# Patient Record
Sex: MALE | Race: BLACK OR AFRICAN AMERICAN
[De-identification: names, ages, dates, MRNs, and addresses within clinical notes are randomized per-mention and may not be internally consistent; named-entity substitution may affect disease eponyms.]

---

## 2020-07-20 ENCOUNTER — HOSPITAL ENCOUNTER (INPATIENT)
Dept: HOSPITAL 15 - ER | Age: 60
LOS: 5 days | Discharge: HOME | DRG: 871 | End: 2020-07-25
Attending: INTERNAL MEDICINE | Admitting: INTERNAL MEDICINE
Payer: COMMERCIAL

## 2020-07-20 VITALS — WEIGHT: 176.37 LBS | HEIGHT: 70 IN | BODY MASS INDEX: 25.25 KG/M2

## 2020-07-20 VITALS — DIASTOLIC BLOOD PRESSURE: 101 MMHG | SYSTOLIC BLOOD PRESSURE: 160 MMHG

## 2020-07-20 DIAGNOSIS — Z88.0: ICD-10-CM

## 2020-07-20 DIAGNOSIS — Z20.828: ICD-10-CM

## 2020-07-20 DIAGNOSIS — A41.01: Primary | ICD-10-CM

## 2020-07-20 DIAGNOSIS — M43.6: ICD-10-CM

## 2020-07-20 DIAGNOSIS — R74.0: ICD-10-CM

## 2020-07-20 DIAGNOSIS — R79.82: ICD-10-CM

## 2020-07-20 DIAGNOSIS — T38.0X5A: ICD-10-CM

## 2020-07-20 DIAGNOSIS — D50.9: ICD-10-CM

## 2020-07-20 DIAGNOSIS — J18.9: ICD-10-CM

## 2020-07-20 DIAGNOSIS — I13.2: ICD-10-CM

## 2020-07-20 DIAGNOSIS — D63.1: ICD-10-CM

## 2020-07-20 DIAGNOSIS — R94.4: ICD-10-CM

## 2020-07-20 DIAGNOSIS — N18.6: ICD-10-CM

## 2020-07-20 DIAGNOSIS — Z99.2: ICD-10-CM

## 2020-07-20 DIAGNOSIS — I50.9: ICD-10-CM

## 2020-07-20 DIAGNOSIS — E87.5: ICD-10-CM

## 2020-07-20 DIAGNOSIS — Z79.899: ICD-10-CM

## 2020-07-20 DIAGNOSIS — E78.5: ICD-10-CM

## 2020-07-20 DIAGNOSIS — D53.9: ICD-10-CM

## 2020-07-20 DIAGNOSIS — R79.89: ICD-10-CM

## 2020-07-20 LAB
ALBUMIN SERPL-MCNC: 3.2 G/DL (ref 3.4–5)
ALBUMIN SERPL-MCNC: 3.7 G/DL (ref 3.4–5)
ALP SERPL-CCNC: 163 U/L (ref 45–117)
ALP SERPL-CCNC: 187 U/L (ref 45–117)
ALT SERPL-CCNC: 18 U/L (ref 16–61)
ALT SERPL-CCNC: 20 U/L (ref 16–61)
ANION GAP SERPL CALCULATED.3IONS-SCNC: 13 MMOL/L (ref 5–15)
ANION GAP SERPL CALCULATED.3IONS-SCNC: 14 MMOL/L (ref 5–15)
APTT PPP: 31.5 SEC (ref 23.64–32.05)
BILIRUB SERPL-MCNC: 0.4 MG/DL (ref 0.2–1)
BILIRUB SERPL-MCNC: 0.6 MG/DL (ref 0.2–1)
BUN SERPL-MCNC: 103 MG/DL (ref 7–18)
BUN SERPL-MCNC: 90 MG/DL (ref 7–18)
BUN/CREAT SERPL: 5.7
BUN/CREAT SERPL: 6.4
CALCIUM SERPL-MCNC: 7.9 MG/DL (ref 8.5–10.1)
CALCIUM SERPL-MCNC: 8.1 MG/DL (ref 8.5–10.1)
CHLORIDE SERPL-SCNC: 101 MMOL/L (ref 98–107)
CHLORIDE SERPL-SCNC: 102 MMOL/L (ref 98–107)
CHOLEST SERPL-MCNC: 120 MG/DL (ref ?–200)
CO2 SERPL-SCNC: 20 MMOL/L (ref 21–32)
CO2 SERPL-SCNC: 21 MMOL/L (ref 21–32)
GLUCOSE SERPL-MCNC: 114 MG/DL (ref 74–106)
GLUCOSE SERPL-MCNC: 116 MG/DL (ref 74–106)
HCT VFR BLD AUTO: 34 % (ref 41–53)
HCT VFR BLD AUTO: 37 % (ref 41–53)
HDLC SERPL-MCNC: 15 MG/DL (ref 40–59)
HGB BLD-MCNC: 11.1 G/DL (ref 13.5–17.5)
HGB BLD-MCNC: 12.1 G/DL (ref 13.5–17.5)
INR PPP: 1.15 (ref 0.9–1.15)
MAGNESIUM SERPL-MCNC: 2 MG/DL (ref 1.6–2.6)
MAGNESIUM SERPL-MCNC: 2.1 MG/DL (ref 1.6–2.6)
MCH RBC QN AUTO: 31 PG (ref 28–32)
MCH RBC QN AUTO: 31.3 PG (ref 28–32)
MCV RBC AUTO: 94.8 FL (ref 80–100)
MCV RBC AUTO: 95.5 FL (ref 80–100)
NRBC BLD QL AUTO: 0 %
NRBC BLD QL AUTO: 0 %
POTASSIUM SERPL-SCNC: 4.4 MMOL/L (ref 3.5–5.1)
POTASSIUM SERPL-SCNC: 4.8 MMOL/L (ref 3.5–5.1)
PROT SERPL-MCNC: 7.3 G/DL (ref 6.4–8.2)
PROT SERPL-MCNC: 8.2 G/DL (ref 6.4–8.2)
SODIUM SERPL-SCNC: 134 MMOL/L (ref 136–145)
SODIUM SERPL-SCNC: 137 MMOL/L (ref 136–145)
TRIGL SERPL-MCNC: 274 MG/DL (ref ?–150)

## 2020-07-20 PROCEDURE — 85730 THROMBOPLASTIN TIME PARTIAL: CPT

## 2020-07-20 PROCEDURE — 83605 ASSAY OF LACTIC ACID: CPT

## 2020-07-20 PROCEDURE — 87880 STREP A ASSAY W/OPTIC: CPT

## 2020-07-20 PROCEDURE — 80048 BASIC METABOLIC PNL TOTAL CA: CPT

## 2020-07-20 PROCEDURE — 80061 LIPID PANEL: CPT

## 2020-07-20 PROCEDURE — 83880 ASSAY OF NATRIURETIC PEPTIDE: CPT

## 2020-07-20 PROCEDURE — 87186 SC STD MICRODIL/AGAR DIL: CPT

## 2020-07-20 PROCEDURE — 99291 CRITICAL CARE FIRST HOUR: CPT

## 2020-07-20 PROCEDURE — 82728 ASSAY OF FERRITIN: CPT

## 2020-07-20 PROCEDURE — 85027 COMPLETE CBC AUTOMATED: CPT

## 2020-07-20 PROCEDURE — 93306 TTE W/DOPPLER COMPLETE: CPT

## 2020-07-20 PROCEDURE — 85379 FIBRIN DEGRADATION QUANT: CPT

## 2020-07-20 PROCEDURE — 87077 CULTURE AEROBIC IDENTIFY: CPT

## 2020-07-20 PROCEDURE — 80202 ASSAY OF VANCOMYCIN: CPT

## 2020-07-20 PROCEDURE — 85610 PROTHROMBIN TIME: CPT

## 2020-07-20 PROCEDURE — 85007 BL SMEAR W/DIFF WBC COUNT: CPT

## 2020-07-20 PROCEDURE — 83036 HEMOGLOBIN GLYCOSYLATED A1C: CPT

## 2020-07-20 PROCEDURE — 36415 COLL VENOUS BLD VENIPUNCTURE: CPT

## 2020-07-20 PROCEDURE — 83550 IRON BINDING TEST: CPT

## 2020-07-20 PROCEDURE — 86141 C-REACTIVE PROTEIN HS: CPT

## 2020-07-20 PROCEDURE — 87070 CULTURE OTHR SPECIMN AEROBIC: CPT

## 2020-07-20 PROCEDURE — 84484 ASSAY OF TROPONIN QUANT: CPT

## 2020-07-20 PROCEDURE — 84443 ASSAY THYROID STIM HORMONE: CPT

## 2020-07-20 PROCEDURE — 93005 ELECTROCARDIOGRAM TRACING: CPT

## 2020-07-20 PROCEDURE — 87804 INFLUENZA ASSAY W/OPTIC: CPT

## 2020-07-20 PROCEDURE — 71045 X-RAY EXAM CHEST 1 VIEW: CPT

## 2020-07-20 PROCEDURE — 87040 BLOOD CULTURE FOR BACTERIA: CPT

## 2020-07-20 PROCEDURE — 78582 LUNG VENTILAT&PERFUS IMAGING: CPT

## 2020-07-20 PROCEDURE — 90935 HEMODIALYSIS ONE EVALUATION: CPT

## 2020-07-20 PROCEDURE — 85025 COMPLETE CBC W/AUTO DIFF WBC: CPT

## 2020-07-20 PROCEDURE — 80053 COMPREHEN METABOLIC PANEL: CPT

## 2020-07-20 PROCEDURE — 84100 ASSAY OF PHOSPHORUS: CPT

## 2020-07-20 PROCEDURE — 83540 ASSAY OF IRON: CPT

## 2020-07-20 PROCEDURE — 83615 LACTATE (LD) (LDH) ENZYME: CPT

## 2020-07-20 PROCEDURE — 83735 ASSAY OF MAGNESIUM: CPT

## 2020-07-20 RX ADMIN — MORPHINE SULFATE PRN MG: 2 INJECTION, SOLUTION INTRAMUSCULAR; INTRAVENOUS at 22:08

## 2020-07-20 RX ADMIN — DOXYCYCLINE SCH MLS/HR: 100 INJECTION, POWDER, LYOPHILIZED, FOR SOLUTION INTRAVENOUS at 23:09

## 2020-07-21 VITALS — DIASTOLIC BLOOD PRESSURE: 98 MMHG | SYSTOLIC BLOOD PRESSURE: 156 MMHG

## 2020-07-21 VITALS — DIASTOLIC BLOOD PRESSURE: 92 MMHG | SYSTOLIC BLOOD PRESSURE: 132 MMHG

## 2020-07-21 VITALS — DIASTOLIC BLOOD PRESSURE: 99 MMHG | SYSTOLIC BLOOD PRESSURE: 162 MMHG

## 2020-07-21 VITALS — DIASTOLIC BLOOD PRESSURE: 92 MMHG | SYSTOLIC BLOOD PRESSURE: 157 MMHG

## 2020-07-21 VITALS — SYSTOLIC BLOOD PRESSURE: 157 MMHG | DIASTOLIC BLOOD PRESSURE: 92 MMHG

## 2020-07-21 VITALS — SYSTOLIC BLOOD PRESSURE: 150 MMHG | DIASTOLIC BLOOD PRESSURE: 93 MMHG

## 2020-07-21 LAB
ALBUMIN SERPL-MCNC: 3 G/DL (ref 3.4–5)
ALP SERPL-CCNC: 138 U/L (ref 45–117)
ALT SERPL-CCNC: 18 U/L (ref 16–61)
ANION GAP SERPL CALCULATED.3IONS-SCNC: 11 MMOL/L (ref 5–15)
APTT PPP: 37.6 SEC (ref 23.64–32.05)
BILIRUB SERPL-MCNC: 0.4 MG/DL (ref 0.2–1)
BUN SERPL-MCNC: 116 MG/DL (ref 7–18)
BUN/CREAT SERPL: 7
CALCIUM SERPL-MCNC: 7.8 MG/DL (ref 8.5–10.1)
CHLORIDE SERPL-SCNC: 102 MMOL/L (ref 98–107)
CO2 SERPL-SCNC: 21 MMOL/L (ref 21–32)
GLUCOSE SERPL-MCNC: 123 MG/DL (ref 74–106)
HCT VFR BLD AUTO: 32 % (ref 41–53)
HGB BLD-MCNC: 10.7 G/DL (ref 13.5–17.5)
INR PPP: 1.07 (ref 0.9–1.15)
IRON SERPL-MCNC: 11 UG/DL (ref 65–175)
MAGNESIUM SERPL-MCNC: 2.4 MG/DL (ref 1.6–2.6)
MCH RBC QN AUTO: 31.2 PG (ref 28–32)
MCV RBC AUTO: 93.6 FL (ref 80–100)
NRBC BLD QL AUTO: 0 %
POTASSIUM SERPL-SCNC: 5.5 MMOL/L (ref 3.5–5.1)
PROT SERPL-MCNC: 7.2 G/DL (ref 6.4–8.2)
SODIUM SERPL-SCNC: 134 MMOL/L (ref 136–145)
TIBC SERPL-MCNC: 158 UG/DL (ref 250–450)

## 2020-07-21 PROCEDURE — 5A1D70Z PERFORMANCE OF URINARY FILTRATION, INTERMITTENT, LESS THAN 6 HOURS PER DAY: ICD-10-PCS | Performed by: INTERNAL MEDICINE

## 2020-07-21 RX ADMIN — RENAGEL SCH MG: 800 TABLET ORAL at 12:00

## 2020-07-21 RX ADMIN — HYDROMORPHONE HYDROCHLORIDE PRN MG: 2 INJECTION, SOLUTION INTRAMUSCULAR; INTRAVENOUS; SUBCUTANEOUS at 19:42

## 2020-07-21 RX ADMIN — VITAMIN D, TAB 1000IU (100/BT) SCH UNIT: 25 TAB at 09:47

## 2020-07-21 RX ADMIN — RENAGEL SCH MG: 800 TABLET ORAL at 17:27

## 2020-07-21 RX ADMIN — ONDANSETRON HYDROCHLORIDE PRN MG: 2 INJECTION, SOLUTION INTRAMUSCULAR; INTRAVENOUS at 09:58

## 2020-07-21 RX ADMIN — METOPROLOL SUCCINATE SCH MG: 50 TABLET, EXTENDED RELEASE ORAL at 09:56

## 2020-07-21 RX ADMIN — Medication SCH MG: at 09:47

## 2020-07-21 RX ADMIN — MORPHINE SULFATE PRN MG: 2 INJECTION, SOLUTION INTRAMUSCULAR; INTRAVENOUS at 09:58

## 2020-07-21 RX ADMIN — RENAGEL SCH MG: 800 TABLET ORAL at 08:00

## 2020-07-21 RX ADMIN — ZINC SULFATE CAP 220 MG (50 MG ELEMENTAL ZN) SCH MG: 220 (50 ZN) CAP at 09:47

## 2020-07-21 RX ADMIN — DOXYCYCLINE SCH MLS/HR: 100 INJECTION, POWDER, LYOPHILIZED, FOR SOLUTION INTRAVENOUS at 09:57

## 2020-07-21 RX ADMIN — DOXYCYCLINE SCH MLS/HR: 100 INJECTION, POWDER, LYOPHILIZED, FOR SOLUTION INTRAVENOUS at 22:17

## 2020-07-21 RX ADMIN — HYDROMORPHONE HYDROCHLORIDE PRN MG: 2 INJECTION, SOLUTION INTRAMUSCULAR; INTRAVENOUS; SUBCUTANEOUS at 01:25

## 2020-07-21 RX ADMIN — MORPHINE SULFATE PRN MG: 2 INJECTION, SOLUTION INTRAMUSCULAR; INTRAVENOUS at 17:04

## 2020-07-21 RX ADMIN — HYDRALAZINE HYDROCHLORIDE PRN MG: 20 INJECTION INTRAMUSCULAR; INTRAVENOUS at 16:47

## 2020-07-22 VITALS — DIASTOLIC BLOOD PRESSURE: 84 MMHG | SYSTOLIC BLOOD PRESSURE: 140 MMHG

## 2020-07-22 VITALS — SYSTOLIC BLOOD PRESSURE: 150 MMHG | DIASTOLIC BLOOD PRESSURE: 101 MMHG

## 2020-07-22 VITALS — DIASTOLIC BLOOD PRESSURE: 86 MMHG | SYSTOLIC BLOOD PRESSURE: 134 MMHG

## 2020-07-22 VITALS — SYSTOLIC BLOOD PRESSURE: 152 MMHG | DIASTOLIC BLOOD PRESSURE: 97 MMHG

## 2020-07-22 VITALS — SYSTOLIC BLOOD PRESSURE: 142 MMHG | DIASTOLIC BLOOD PRESSURE: 91 MMHG

## 2020-07-22 LAB
ANION GAP SERPL CALCULATED.3IONS-SCNC: 11 MMOL/L (ref 5–15)
BUN SERPL-MCNC: 83 MG/DL (ref 7–18)
BUN/CREAT SERPL: 6.7
CALCIUM SERPL-MCNC: 7.3 MG/DL (ref 8.5–10.1)
CHLORIDE SERPL-SCNC: 98 MMOL/L (ref 98–107)
CO2 SERPL-SCNC: 25 MMOL/L (ref 21–32)
GLUCOSE SERPL-MCNC: 108 MG/DL (ref 74–106)
HCT VFR BLD AUTO: 33.7 % (ref 41–53)
HGB BLD-MCNC: 11.1 G/DL (ref 13.5–17.5)
MAGNESIUM SERPL-MCNC: 2.1 MG/DL (ref 1.6–2.6)
MCH RBC QN AUTO: 30.6 PG (ref 28–32)
MCV RBC AUTO: 93.4 FL (ref 80–100)
NRBC BLD QL AUTO: 0 %
POTASSIUM SERPL-SCNC: 4 MMOL/L (ref 3.5–5.1)
SODIUM SERPL-SCNC: 134 MMOL/L (ref 136–145)

## 2020-07-22 PROCEDURE — 5A1D70Z PERFORMANCE OF URINARY FILTRATION, INTERMITTENT, LESS THAN 6 HOURS PER DAY: ICD-10-PCS | Performed by: INTERNAL MEDICINE

## 2020-07-22 RX ADMIN — RENAGEL SCH MG: 800 TABLET ORAL at 09:41

## 2020-07-22 RX ADMIN — RENAGEL SCH MG: 800 TABLET ORAL at 17:25

## 2020-07-22 RX ADMIN — MORPHINE SULFATE PRN MG: 2 INJECTION, SOLUTION INTRAMUSCULAR; INTRAVENOUS at 18:47

## 2020-07-22 RX ADMIN — MORPHINE SULFATE PRN MG: 2 INJECTION, SOLUTION INTRAMUSCULAR; INTRAVENOUS at 06:17

## 2020-07-22 RX ADMIN — DEXTROSE SCH UNITS: 5 SOLUTION INTRAVENOUS at 09:42

## 2020-07-22 RX ADMIN — DOXYCYCLINE SCH MLS/HR: 100 INJECTION, POWDER, LYOPHILIZED, FOR SOLUTION INTRAVENOUS at 09:41

## 2020-07-22 RX ADMIN — ZINC SULFATE CAP 220 MG (50 MG ELEMENTAL ZN) SCH MG: 220 (50 ZN) CAP at 08:14

## 2020-07-22 RX ADMIN — RENAGEL SCH MG: 800 TABLET ORAL at 12:34

## 2020-07-22 RX ADMIN — MORPHINE SULFATE PRN MG: 2 INJECTION, SOLUTION INTRAMUSCULAR; INTRAVENOUS at 01:47

## 2020-07-22 RX ADMIN — DEXTROSE SCH UNITS: 5 SOLUTION INTRAVENOUS at 21:33

## 2020-07-22 RX ADMIN — METOPROLOL SUCCINATE SCH MG: 50 TABLET, EXTENDED RELEASE ORAL at 09:41

## 2020-07-22 RX ADMIN — VITAMIN D, TAB 1000IU (100/BT) SCH UNIT: 25 TAB at 09:41

## 2020-07-22 RX ADMIN — ONDANSETRON HYDROCHLORIDE PRN MG: 2 INJECTION, SOLUTION INTRAMUSCULAR; INTRAVENOUS at 09:40

## 2020-07-22 RX ADMIN — DOXYCYCLINE SCH MLS/HR: 100 INJECTION, POWDER, LYOPHILIZED, FOR SOLUTION INTRAVENOUS at 21:31

## 2020-07-22 RX ADMIN — HYDROMORPHONE HYDROCHLORIDE PRN MG: 2 INJECTION, SOLUTION INTRAMUSCULAR; INTRAVENOUS; SUBCUTANEOUS at 09:41

## 2020-07-22 RX ADMIN — Medication SCH MG: at 08:14

## 2020-07-23 VITALS — DIASTOLIC BLOOD PRESSURE: 96 MMHG | SYSTOLIC BLOOD PRESSURE: 124 MMHG

## 2020-07-23 VITALS — DIASTOLIC BLOOD PRESSURE: 90 MMHG | SYSTOLIC BLOOD PRESSURE: 134 MMHG

## 2020-07-23 VITALS — DIASTOLIC BLOOD PRESSURE: 91 MMHG | SYSTOLIC BLOOD PRESSURE: 138 MMHG

## 2020-07-23 VITALS — DIASTOLIC BLOOD PRESSURE: 99 MMHG | SYSTOLIC BLOOD PRESSURE: 149 MMHG

## 2020-07-23 VITALS — DIASTOLIC BLOOD PRESSURE: 95 MMHG | SYSTOLIC BLOOD PRESSURE: 166 MMHG

## 2020-07-23 LAB
ALBUMIN SERPL-MCNC: 2.9 G/DL (ref 3.4–5)
ALP SERPL-CCNC: 135 U/L (ref 45–117)
ALT SERPL-CCNC: 15 U/L (ref 16–61)
ANION GAP SERPL CALCULATED.3IONS-SCNC: 10 MMOL/L (ref 5–15)
BILIRUB SERPL-MCNC: 0.4 MG/DL (ref 0.2–1)
BUN SERPL-MCNC: 69 MG/DL (ref 7–18)
BUN/CREAT SERPL: 6.7
CALCIUM SERPL-MCNC: 7.6 MG/DL (ref 8.5–10.1)
CHLORIDE SERPL-SCNC: 97 MMOL/L (ref 98–107)
CO2 SERPL-SCNC: 27 MMOL/L (ref 21–32)
GLUCOSE SERPL-MCNC: 121 MG/DL (ref 74–106)
HCT VFR BLD AUTO: 35 % (ref 41–53)
HGB BLD-MCNC: 11.7 G/DL (ref 13.5–17.5)
MCH RBC QN AUTO: 31.5 PG (ref 28–32)
MCV RBC AUTO: 94.4 FL (ref 80–100)
POTASSIUM SERPL-SCNC: 3.9 MMOL/L (ref 3.5–5.1)
PROT SERPL-MCNC: 7.4 G/DL (ref 6.4–8.2)
SODIUM SERPL-SCNC: 134 MMOL/L (ref 136–145)

## 2020-07-23 PROCEDURE — 5A1D70Z PERFORMANCE OF URINARY FILTRATION, INTERMITTENT, LESS THAN 6 HOURS PER DAY: ICD-10-PCS | Performed by: INTERNAL MEDICINE

## 2020-07-23 RX ADMIN — HYDRALAZINE HYDROCHLORIDE PRN MG: 20 INJECTION INTRAMUSCULAR; INTRAVENOUS at 08:36

## 2020-07-23 RX ADMIN — RENAGEL SCH MG: 800 TABLET ORAL at 12:12

## 2020-07-23 RX ADMIN — METOPROLOL SUCCINATE SCH MG: 50 TABLET, EXTENDED RELEASE ORAL at 09:50

## 2020-07-23 RX ADMIN — MORPHINE SULFATE PRN MG: 2 INJECTION, SOLUTION INTRAMUSCULAR; INTRAVENOUS at 09:49

## 2020-07-23 RX ADMIN — RENAGEL SCH MG: 800 TABLET ORAL at 17:35

## 2020-07-23 RX ADMIN — DOXYCYCLINE SCH MLS/HR: 100 INJECTION, POWDER, LYOPHILIZED, FOR SOLUTION INTRAVENOUS at 21:51

## 2020-07-23 RX ADMIN — MORPHINE SULFATE PRN MG: 2 INJECTION, SOLUTION INTRAMUSCULAR; INTRAVENOUS at 01:19

## 2020-07-23 RX ADMIN — RENAGEL SCH MG: 800 TABLET ORAL at 08:35

## 2020-07-23 RX ADMIN — TROLAMINE SALICYLATE PRN APPLIC: 10 CREAM TOPICAL at 14:00

## 2020-07-23 RX ADMIN — DOXYCYCLINE SCH MLS/HR: 100 INJECTION, POWDER, LYOPHILIZED, FOR SOLUTION INTRAVENOUS at 09:50

## 2020-07-23 RX ADMIN — VITAMIN D, TAB 1000IU (100/BT) SCH UNIT: 25 TAB at 09:50

## 2020-07-23 RX ADMIN — MORPHINE SULFATE PRN MG: 2 INJECTION, SOLUTION INTRAMUSCULAR; INTRAVENOUS at 17:35

## 2020-07-23 RX ADMIN — DEXTROSE SCH UNITS: 5 SOLUTION INTRAVENOUS at 09:51

## 2020-07-23 RX ADMIN — DEXTROSE SCH UNITS: 5 SOLUTION INTRAVENOUS at 21:51

## 2020-07-24 VITALS — DIASTOLIC BLOOD PRESSURE: 93 MMHG | SYSTOLIC BLOOD PRESSURE: 129 MMHG

## 2020-07-24 VITALS — SYSTOLIC BLOOD PRESSURE: 160 MMHG | DIASTOLIC BLOOD PRESSURE: 100 MMHG

## 2020-07-24 VITALS — DIASTOLIC BLOOD PRESSURE: 100 MMHG | SYSTOLIC BLOOD PRESSURE: 126 MMHG

## 2020-07-24 VITALS — DIASTOLIC BLOOD PRESSURE: 97 MMHG | SYSTOLIC BLOOD PRESSURE: 134 MMHG

## 2020-07-24 VITALS — DIASTOLIC BLOOD PRESSURE: 96 MMHG | SYSTOLIC BLOOD PRESSURE: 142 MMHG

## 2020-07-24 LAB
ANION GAP SERPL CALCULATED.3IONS-SCNC: 12 MMOL/L (ref 5–15)
BUN SERPL-MCNC: 62 MG/DL (ref 7–18)
BUN/CREAT SERPL: 6.6
CALCIUM SERPL-MCNC: 8.2 MG/DL (ref 8.5–10.1)
CHLORIDE SERPL-SCNC: 96 MMOL/L (ref 98–107)
CO2 SERPL-SCNC: 26 MMOL/L (ref 21–32)
GLUCOSE SERPL-MCNC: 131 MG/DL (ref 74–106)
HCT VFR BLD AUTO: 38.3 % (ref 41–53)
HGB BLD-MCNC: 12.6 G/DL (ref 13.5–17.5)
MCH RBC QN AUTO: 30.6 PG (ref 28–32)
MCV RBC AUTO: 93.1 FL (ref 80–100)
POTASSIUM SERPL-SCNC: 4 MMOL/L (ref 3.5–5.1)
SODIUM SERPL-SCNC: 134 MMOL/L (ref 136–145)

## 2020-07-24 RX ADMIN — RENAGEL SCH MG: 800 TABLET ORAL at 18:45

## 2020-07-24 RX ADMIN — METOPROLOL SUCCINATE SCH MG: 50 TABLET, EXTENDED RELEASE ORAL at 10:37

## 2020-07-24 RX ADMIN — RENAGEL SCH MG: 800 TABLET ORAL at 08:00

## 2020-07-24 RX ADMIN — DOXYCYCLINE SCH MLS/HR: 100 INJECTION, POWDER, LYOPHILIZED, FOR SOLUTION INTRAVENOUS at 10:39

## 2020-07-24 RX ADMIN — TROLAMINE SALICYLATE PRN APPLIC: 10 CREAM TOPICAL at 08:24

## 2020-07-24 RX ADMIN — RENAGEL SCH MG: 800 TABLET ORAL at 12:40

## 2020-07-24 RX ADMIN — DEXTROSE SCH UNITS: 5 SOLUTION INTRAVENOUS at 10:41

## 2020-07-24 RX ADMIN — DEXTROSE SCH UNITS: 5 SOLUTION INTRAVENOUS at 22:41

## 2020-07-24 RX ADMIN — DOXYCYCLINE SCH MLS/HR: 100 INJECTION, POWDER, LYOPHILIZED, FOR SOLUTION INTRAVENOUS at 22:22

## 2020-07-24 RX ADMIN — VITAMIN D, TAB 1000IU (100/BT) SCH UNIT: 25 TAB at 10:37

## 2020-07-25 VITALS — DIASTOLIC BLOOD PRESSURE: 101 MMHG | SYSTOLIC BLOOD PRESSURE: 159 MMHG

## 2020-07-25 VITALS — SYSTOLIC BLOOD PRESSURE: 137 MMHG | DIASTOLIC BLOOD PRESSURE: 90 MMHG

## 2020-07-25 VITALS — SYSTOLIC BLOOD PRESSURE: 150 MMHG | DIASTOLIC BLOOD PRESSURE: 109 MMHG

## 2020-07-25 VITALS — SYSTOLIC BLOOD PRESSURE: 160 MMHG | DIASTOLIC BLOOD PRESSURE: 98 MMHG

## 2020-07-25 LAB
ANION GAP SERPL CALCULATED.3IONS-SCNC: 11 MMOL/L (ref 5–15)
BUN SERPL-MCNC: 58 MG/DL (ref 7–18)
BUN/CREAT SERPL: 7
CALCIUM SERPL-MCNC: 7.5 MG/DL (ref 8.5–10.1)
CHLORIDE SERPL-SCNC: 96 MMOL/L (ref 98–107)
CO2 SERPL-SCNC: 28 MMOL/L (ref 21–32)
GLUCOSE SERPL-MCNC: 98 MG/DL (ref 74–106)
POTASSIUM SERPL-SCNC: 4.3 MMOL/L (ref 3.5–5.1)
SODIUM SERPL-SCNC: 135 MMOL/L (ref 136–145)

## 2020-07-25 PROCEDURE — 5A1D70Z PERFORMANCE OF URINARY FILTRATION, INTERMITTENT, LESS THAN 6 HOURS PER DAY: ICD-10-PCS | Performed by: INTERNAL MEDICINE

## 2020-07-25 RX ADMIN — RENAGEL SCH MG: 800 TABLET ORAL at 10:01

## 2020-07-25 RX ADMIN — DEXTROSE SCH UNITS: 5 SOLUTION INTRAVENOUS at 10:41

## 2020-07-25 RX ADMIN — RENAGEL SCH MG: 800 TABLET ORAL at 12:11

## 2020-07-25 RX ADMIN — DOXYCYCLINE SCH MLS/HR: 100 INJECTION, POWDER, LYOPHILIZED, FOR SOLUTION INTRAVENOUS at 12:11

## 2020-07-25 RX ADMIN — VITAMIN D, TAB 1000IU (100/BT) SCH UNIT: 25 TAB at 10:40

## 2020-07-25 RX ADMIN — METOPROLOL SUCCINATE SCH MG: 50 TABLET, EXTENDED RELEASE ORAL at 10:40

## 2020-07-31 ENCOUNTER — HOSPITAL ENCOUNTER (EMERGENCY)
Dept: HOSPITAL 15 - ER | Age: 60
Discharge: HOME | End: 2020-07-31
Payer: COMMERCIAL

## 2020-07-31 VITALS — HEIGHT: 70 IN | BODY MASS INDEX: 25.05 KG/M2 | WEIGHT: 175 LBS

## 2020-07-31 VITALS — DIASTOLIC BLOOD PRESSURE: 79 MMHG | SYSTOLIC BLOOD PRESSURE: 129 MMHG

## 2020-07-31 DIAGNOSIS — N18.6: ICD-10-CM

## 2020-07-31 DIAGNOSIS — Z88.0: ICD-10-CM

## 2020-07-31 DIAGNOSIS — I12.0: ICD-10-CM

## 2020-07-31 DIAGNOSIS — M50.10: Primary | ICD-10-CM

## 2020-07-31 PROCEDURE — 72040 X-RAY EXAM NECK SPINE 2-3 VW: CPT

## 2020-12-02 ENCOUNTER — HOSPITAL ENCOUNTER (EMERGENCY)
Dept: HOSPITAL 15 - ER | Age: 60
Discharge: LEFT BEFORE DISCHARGE | End: 2020-12-02
Payer: COMMERCIAL

## 2020-12-02 DIAGNOSIS — Z53.21: ICD-10-CM

## 2020-12-02 DIAGNOSIS — R50.9: Primary | ICD-10-CM

## 2020-12-05 ENCOUNTER — HOSPITAL ENCOUNTER (EMERGENCY)
Dept: HOSPITAL 15 - ER | Age: 60
Discharge: HOME | End: 2020-12-05
Payer: COMMERCIAL

## 2020-12-05 VITALS — DIASTOLIC BLOOD PRESSURE: 65 MMHG | SYSTOLIC BLOOD PRESSURE: 102 MMHG

## 2020-12-05 VITALS — WEIGHT: 180 LBS | HEIGHT: 70 IN | BODY MASS INDEX: 25.77 KG/M2

## 2020-12-05 DIAGNOSIS — M51.36: Primary | ICD-10-CM

## 2020-12-05 DIAGNOSIS — Z88.0: ICD-10-CM

## 2020-12-05 DIAGNOSIS — M79.18: ICD-10-CM

## 2020-12-05 DIAGNOSIS — N18.6: ICD-10-CM

## 2020-12-05 DIAGNOSIS — I12.0: ICD-10-CM

## 2020-12-05 PROCEDURE — 73030 X-RAY EXAM OF SHOULDER: CPT

## 2020-12-05 PROCEDURE — 72070 X-RAY EXAM THORAC SPINE 2VWS: CPT

## 2020-12-05 PROCEDURE — 72100 X-RAY EXAM L-S SPINE 2/3 VWS: CPT

## 2020-12-07 ENCOUNTER — HOSPITAL ENCOUNTER (INPATIENT)
Dept: HOSPITAL 15 - ER | Age: 60
LOS: 6 days | Discharge: NURSING, REHAB OR LONG-TERM CARE | DRG: 871 | End: 2020-12-13
Attending: INTERNAL MEDICINE | Admitting: NURSE PRACTITIONER
Payer: COMMERCIAL

## 2020-12-07 VITALS — WEIGHT: 166.67 LBS | BODY MASS INDEX: 23.86 KG/M2 | HEIGHT: 70 IN

## 2020-12-07 DIAGNOSIS — Z79.899: ICD-10-CM

## 2020-12-07 DIAGNOSIS — E44.0: ICD-10-CM

## 2020-12-07 DIAGNOSIS — D63.8: ICD-10-CM

## 2020-12-07 DIAGNOSIS — A41.89: Primary | ICD-10-CM

## 2020-12-07 DIAGNOSIS — I12.0: ICD-10-CM

## 2020-12-07 DIAGNOSIS — Z20.828: ICD-10-CM

## 2020-12-07 DIAGNOSIS — Z83.3: ICD-10-CM

## 2020-12-07 DIAGNOSIS — N18.6: ICD-10-CM

## 2020-12-07 DIAGNOSIS — B96.89: ICD-10-CM

## 2020-12-07 DIAGNOSIS — E87.1: ICD-10-CM

## 2020-12-07 DIAGNOSIS — Z75.1: ICD-10-CM

## 2020-12-07 DIAGNOSIS — N25.81: ICD-10-CM

## 2020-12-07 DIAGNOSIS — Z88.0: ICD-10-CM

## 2020-12-07 DIAGNOSIS — E87.5: ICD-10-CM

## 2020-12-07 DIAGNOSIS — D63.1: ICD-10-CM

## 2020-12-07 DIAGNOSIS — Z99.2: ICD-10-CM

## 2020-12-07 LAB
ALBUMIN SERPL-MCNC: 2.6 G/DL (ref 3.4–5)
ALP SERPL-CCNC: 134 U/L (ref 45–117)
ALT SERPL-CCNC: 62 U/L (ref 16–61)
ANION GAP SERPL CALCULATED.3IONS-SCNC: 22 MMOL/L (ref 5–15)
BILIRUB SERPL-MCNC: 0.5 MG/DL (ref 0.2–1)
BUN SERPL-MCNC: 139 MG/DL (ref 7–18)
BUN/CREAT SERPL: 6.2
CALCIUM SERPL-MCNC: 7.5 MG/DL (ref 8.5–10.1)
CHLORIDE SERPL-SCNC: 85 MMOL/L (ref 98–107)
CO2 SERPL-SCNC: 17 MMOL/L (ref 21–32)
GLUCOSE SERPL-MCNC: 134 MG/DL (ref 74–106)
HCT VFR BLD AUTO: 31.3 % (ref 41–53)
HGB BLD-MCNC: 10 G/DL (ref 13.5–17.5)
MCH RBC QN AUTO: 29.8 PG (ref 28–32)
MCV RBC AUTO: 93.4 FL (ref 80–100)
NRBC BLD QL AUTO: 0 %
POTASSIUM SERPL-SCNC: 5.2 MMOL/L (ref 3.5–5.1)
PROT SERPL-MCNC: 7.4 G/DL (ref 6.4–8.2)
SODIUM SERPL-SCNC: 124 MMOL/L (ref 136–145)

## 2020-12-07 PROCEDURE — 80053 COMPREHEN METABOLIC PANEL: CPT

## 2020-12-07 PROCEDURE — 87077 CULTURE AEROBIC IDENTIFY: CPT

## 2020-12-07 PROCEDURE — 83880 ASSAY OF NATRIURETIC PEPTIDE: CPT

## 2020-12-07 PROCEDURE — 87040 BLOOD CULTURE FOR BACTERIA: CPT

## 2020-12-07 PROCEDURE — 83615 LACTATE (LD) (LDH) ENZYME: CPT

## 2020-12-07 PROCEDURE — 87186 SC STD MICRODIL/AGAR DIL: CPT

## 2020-12-07 PROCEDURE — 84484 ASSAY OF TROPONIN QUANT: CPT

## 2020-12-07 PROCEDURE — 71045 X-RAY EXAM CHEST 1 VIEW: CPT

## 2020-12-07 PROCEDURE — 87426 SARSCOV CORONAVIRUS AG IA: CPT

## 2020-12-07 PROCEDURE — 90935 HEMODIALYSIS ONE EVALUATION: CPT

## 2020-12-07 PROCEDURE — 82962 GLUCOSE BLOOD TEST: CPT

## 2020-12-07 PROCEDURE — 96365 THER/PROPH/DIAG IV INF INIT: CPT

## 2020-12-07 PROCEDURE — 84132 ASSAY OF SERUM POTASSIUM: CPT

## 2020-12-07 PROCEDURE — 94640 AIRWAY INHALATION TREATMENT: CPT

## 2020-12-07 PROCEDURE — 86141 C-REACTIVE PROTEIN HS: CPT

## 2020-12-07 PROCEDURE — 97163 PT EVAL HIGH COMPLEX 45 MIN: CPT

## 2020-12-07 PROCEDURE — 80048 BASIC METABOLIC PNL TOTAL CA: CPT

## 2020-12-07 PROCEDURE — 85025 COMPLETE CBC W/AUTO DIFF WBC: CPT

## 2020-12-07 PROCEDURE — 83735 ASSAY OF MAGNESIUM: CPT

## 2020-12-07 PROCEDURE — 87147 CULTURE TYPE IMMUNOLOGIC: CPT

## 2020-12-07 PROCEDURE — 82565 ASSAY OF CREATININE: CPT

## 2020-12-07 PROCEDURE — 36415 COLL VENOUS BLD VENIPUNCTURE: CPT

## 2020-12-07 PROCEDURE — 82728 ASSAY OF FERRITIN: CPT

## 2020-12-07 PROCEDURE — 80202 ASSAY OF VANCOMYCIN: CPT

## 2020-12-07 RX ADMIN — SODIUM ZIRCONIUM CYCLOSILICATE SCH GM: 5 POWDER, FOR SUSPENSION ORAL at 22:18

## 2020-12-07 RX ADMIN — ACETAMINOPHEN PRN MG: 500 TABLET ORAL at 22:40

## 2020-12-07 RX ADMIN — FAMOTIDINE SCH MG: 20 TABLET, FILM COATED ORAL at 18:14

## 2020-12-07 RX ADMIN — RENAGEL SCH MG: 800 TABLET ORAL at 18:52

## 2020-12-07 RX ADMIN — SODIUM ZIRCONIUM CYCLOSILICATE SCH GM: 5 POWDER, FOR SUSPENSION ORAL at 22:00

## 2020-12-08 VITALS — SYSTOLIC BLOOD PRESSURE: 110 MMHG | DIASTOLIC BLOOD PRESSURE: 60 MMHG

## 2020-12-08 VITALS — DIASTOLIC BLOOD PRESSURE: 60 MMHG | SYSTOLIC BLOOD PRESSURE: 110 MMHG

## 2020-12-08 VITALS — SYSTOLIC BLOOD PRESSURE: 108 MMHG | DIASTOLIC BLOOD PRESSURE: 54 MMHG

## 2020-12-08 VITALS — SYSTOLIC BLOOD PRESSURE: 93 MMHG | DIASTOLIC BLOOD PRESSURE: 60 MMHG

## 2020-12-08 LAB
ALBUMIN SERPL-MCNC: 2.4 G/DL (ref 3.4–5)
ALP SERPL-CCNC: 117 U/L (ref 45–117)
ALT SERPL-CCNC: 59 U/L (ref 16–61)
ANION GAP SERPL CALCULATED.3IONS-SCNC: 22 MMOL/L (ref 5–15)
BILIRUB SERPL-MCNC: 0.5 MG/DL (ref 0.2–1)
BUN SERPL-MCNC: 180 MG/DL (ref 7–18)
BUN/CREAT SERPL: 7.4
CALCIUM SERPL-MCNC: 7.6 MG/DL (ref 8.5–10.1)
CHLORIDE SERPL-SCNC: 85 MMOL/L (ref 98–107)
CO2 SERPL-SCNC: 20 MMOL/L (ref 21–32)
GLUCOSE SERPL-MCNC: 125 MG/DL (ref 74–106)
HCT VFR BLD AUTO: 30.4 % (ref 41–53)
HGB BLD-MCNC: 10.1 G/DL (ref 13.5–17.5)
MCH RBC QN AUTO: 30.6 PG (ref 28–32)
MCV RBC AUTO: 92.6 FL (ref 80–100)
NRBC BLD QL AUTO: 0.1 %
POTASSIUM SERPL-SCNC: 5.9 MMOL/L (ref 3.5–5.1)
PROT SERPL-MCNC: 6.8 G/DL (ref 6.4–8.2)
SODIUM SERPL-SCNC: 127 MMOL/L (ref 136–145)

## 2020-12-08 PROCEDURE — 5A1D70Z PERFORMANCE OF URINARY FILTRATION, INTERMITTENT, LESS THAN 6 HOURS PER DAY: ICD-10-PCS | Performed by: INTERNAL MEDICINE

## 2020-12-08 RX ADMIN — METOPROLOL SUCCINATE SCH MG: 50 TABLET, EXTENDED RELEASE ORAL at 10:35

## 2020-12-08 RX ADMIN — CEFTRIAXONE SODIUM SCH MLS/HR: 1 INJECTION, POWDER, FOR SOLUTION INTRAMUSCULAR; INTRAVENOUS at 09:00

## 2020-12-08 RX ADMIN — RENAGEL SCH MG: 800 TABLET ORAL at 18:54

## 2020-12-08 RX ADMIN — ENOXAPARIN SODIUM SCH MG: 40 INJECTION SUBCUTANEOUS at 10:32

## 2020-12-08 RX ADMIN — RENAGEL SCH MG: 800 TABLET ORAL at 15:05

## 2020-12-08 RX ADMIN — SODIUM ZIRCONIUM CYCLOSILICATE SCH GM: 5 POWDER, FOR SUSPENSION ORAL at 06:11

## 2020-12-08 RX ADMIN — MORPHINE SULFATE PRN MG: 2 INJECTION, SOLUTION INTRAMUSCULAR; INTRAVENOUS at 15:00

## 2020-12-08 RX ADMIN — RENAGEL SCH MG: 800 TABLET ORAL at 10:35

## 2020-12-08 NOTE — NUR
Telemetry admit from ER



ASHWIN VILLAFANA admitted to Telemetry unit after SBAR received. Patient oriented to Arlet Ayon, primary RN, unit, room, bed, and unit policies regarding patient care and visiting 
hours. Patient now on continuous telemetry monitoring, tele box #55 and telemetry reading on 
arrival to unit is SIN[place]ACH . Updated on POC and instructed to call for assistance 
as needed. Bed locked in lowest position, side rails up x2, call light within reach. Safety 
precautions in place. Will continue to monitor for changes.

## 2020-12-08 NOTE — NUR
Opening Shift Note

Assumed care of patient, awake and alert.  No S/S of distress/SOB, patient c/o pain in his 
back, will administer pain medication per protocol. Patient in the lowest possible position 
with call light within reach and bed rails up x2.  Instructed on POC and to call for assist 
PRN, will continue to monitor for changes Q1hr and PRN.

## 2020-12-09 VITALS — DIASTOLIC BLOOD PRESSURE: 56 MMHG | SYSTOLIC BLOOD PRESSURE: 104 MMHG

## 2020-12-09 VITALS — SYSTOLIC BLOOD PRESSURE: 101 MMHG | DIASTOLIC BLOOD PRESSURE: 58 MMHG

## 2020-12-09 VITALS — SYSTOLIC BLOOD PRESSURE: 112 MMHG | DIASTOLIC BLOOD PRESSURE: 54 MMHG

## 2020-12-09 VITALS — SYSTOLIC BLOOD PRESSURE: 128 MMHG | DIASTOLIC BLOOD PRESSURE: 69 MMHG

## 2020-12-09 LAB
ANION GAP SERPL CALCULATED.3IONS-SCNC: 18 MMOL/L (ref 5–15)
BUN SERPL-MCNC: 142 MG/DL (ref 7–18)
BUN/CREAT SERPL: 7.6
CALCIUM SERPL-MCNC: 7.5 MG/DL (ref 8.5–10.1)
CHLORIDE SERPL-SCNC: 90 MMOL/L (ref 98–107)
CO2 SERPL-SCNC: 23 MMOL/L (ref 21–32)
GLUCOSE SERPL-MCNC: 111 MG/DL (ref 74–106)
HCT VFR BLD AUTO: 27.8 % (ref 41–53)
HGB BLD-MCNC: 9.2 G/DL (ref 13.5–17.5)
MAGNESIUM SERPL-MCNC: 2.3 MG/DL (ref 1.6–2.6)
MCH RBC QN AUTO: 30.6 PG (ref 28–32)
MCV RBC AUTO: 92.7 FL (ref 80–100)
NRBC BLD QL AUTO: 0 %
POTASSIUM SERPL-SCNC: 5.1 MMOL/L (ref 3.5–5.1)
SODIUM SERPL-SCNC: 131 MMOL/L (ref 136–145)

## 2020-12-09 PROCEDURE — 5A1D70Z PERFORMANCE OF URINARY FILTRATION, INTERMITTENT, LESS THAN 6 HOURS PER DAY: ICD-10-PCS | Performed by: INTERNAL MEDICINE

## 2020-12-09 RX ADMIN — RENAGEL SCH MG: 800 TABLET ORAL at 17:46

## 2020-12-09 RX ADMIN — ENOXAPARIN SODIUM SCH MG: 40 INJECTION SUBCUTANEOUS at 09:34

## 2020-12-09 RX ADMIN — METOPROLOL SUCCINATE SCH MG: 50 TABLET, EXTENDED RELEASE ORAL at 09:34

## 2020-12-09 RX ADMIN — HEPARIN SODIUM ONE UNITS: 1000 INJECTION, SOLUTION INTRAVENOUS; SUBCUTANEOUS at 10:30

## 2020-12-09 RX ADMIN — RENAGEL SCH MG: 800 TABLET ORAL at 09:33

## 2020-12-09 RX ADMIN — FAMOTIDINE SCH MG: 20 TABLET, FILM COATED ORAL at 15:00

## 2020-12-09 RX ADMIN — CEFTRIAXONE SODIUM SCH MLS/HR: 1 INJECTION, POWDER, FOR SOLUTION INTRAMUSCULAR; INTRAVENOUS at 09:33

## 2020-12-09 RX ADMIN — HYDROCODONE BITARTRATE AND ACETAMINOPHEN PRN TAB: 5; 325 TABLET ORAL at 04:56

## 2020-12-09 RX ADMIN — CARISOPRODOL PRN MG: 350 TABLET ORAL at 12:24

## 2020-12-09 RX ADMIN — RENAGEL SCH MG: 800 TABLET ORAL at 08:00

## 2020-12-09 RX ADMIN — METOPROLOL SUCCINATE SCH MG: 50 TABLET, EXTENDED RELEASE ORAL at 10:00

## 2020-12-09 RX ADMIN — HYDROCODONE BITARTRATE AND ACETAMINOPHEN PRN TAB: 5; 325 TABLET ORAL at 03:10

## 2020-12-09 RX ADMIN — RENAGEL SCH MG: 800 TABLET ORAL at 11:20

## 2020-12-09 NOTE — NUR
Critical lab 

trending down; , previously 180

Creatinine 18.8, previously 24.3. 

Labs trending down, will continue to monitor patient.

## 2020-12-09 NOTE — NUR
PATIENT HAD LARGE BOWEL MOVEMENT

PATIENT CLEANED UP AND COMPLETE LINEN CHANGED. PATIENT POSITIONED FOR COMFORT.

## 2020-12-09 NOTE — NUR
DISCHARGE HELD TILL TOMORROW

PER MD FAYE PATIENT IS TO BE DISCHARGED AFTER DIALYSIS IS COMPLETE AND IF PATIENT IS 
AMBULATING AND MOVING AROUND INDEPENDENTLY. AT THIS TIME PATIENT IS NON AMBULATORY, HUNCHED 
OVER IN PAIN IN BED. PATIENT STATES HE DOES NOT WANT TO MOVE AND HE WOULD RATHER GO HOME IN 
THE MORNING.

## 2020-12-09 NOTE — NUR
Opening Shift Note

Assumed care of patient, awake and alert.  No S/S of distress/SOB or pain.  Instructed on 
POC and to call for assist PRN, will continue to monitor for changes Q1hr and PRN. Bed 
locked and in lowest position with call light in reach.

## 2020-12-09 NOTE — NUR
DIALYSIS COMPLETED 

PATIENT TOLERATED WELL PER DIALYSIS RN. VITALS STABLE. LAST /54, . PER DIALYSIS 
RN 2L REMOVED.

## 2020-12-09 NOTE — NUR
Opening Shift Note

Assumed care of patient, awake and alert. No S/S of distress/SOB, patient c/o of lower back 
pain but denies any pain medication at this time. Updated on POC and instructed to call for 
assistance as needed, patient verbalized understanding. Bed locked in lowest position, side 
rails up x2, call light within reach. Will continue to monitor for changes Q1hr and PRN.

## 2020-12-10 VITALS — SYSTOLIC BLOOD PRESSURE: 124 MMHG | DIASTOLIC BLOOD PRESSURE: 67 MMHG

## 2020-12-10 VITALS — SYSTOLIC BLOOD PRESSURE: 129 MMHG | DIASTOLIC BLOOD PRESSURE: 70 MMHG

## 2020-12-10 VITALS — DIASTOLIC BLOOD PRESSURE: 68 MMHG | SYSTOLIC BLOOD PRESSURE: 101 MMHG

## 2020-12-10 LAB
ANION GAP SERPL CALCULATED.3IONS-SCNC: 13 MMOL/L (ref 5–15)
BUN SERPL-MCNC: 93 MG/DL (ref 7–18)
BUN/CREAT SERPL: 6.7
CALCIUM SERPL-MCNC: 8 MG/DL (ref 8.5–10.1)
CHLORIDE SERPL-SCNC: 94 MMOL/L (ref 98–107)
CO2 SERPL-SCNC: 22 MMOL/L (ref 21–32)
GLUCOSE SERPL-MCNC: 119 MG/DL (ref 74–106)
HCT VFR BLD AUTO: 29.3 % (ref 41–53)
HGB BLD-MCNC: 9.5 G/DL (ref 13.5–17.5)
MCH RBC QN AUTO: 30.1 PG (ref 28–32)
MCV RBC AUTO: 92.4 FL (ref 80–100)
NRBC BLD QL AUTO: 0 %
POTASSIUM SERPL-SCNC: 5 MMOL/L (ref 3.5–5.1)
SODIUM SERPL-SCNC: 129 MMOL/L (ref 136–145)

## 2020-12-10 RX ADMIN — RENAGEL SCH MG: 800 TABLET ORAL at 08:19

## 2020-12-10 RX ADMIN — METOPROLOL SUCCINATE SCH MG: 50 TABLET, EXTENDED RELEASE ORAL at 09:52

## 2020-12-10 RX ADMIN — ACETAMINOPHEN PRN MG: 500 TABLET ORAL at 12:41

## 2020-12-10 RX ADMIN — CARISOPRODOL PRN MG: 350 TABLET ORAL at 10:03

## 2020-12-10 RX ADMIN — CEFTRIAXONE SODIUM SCH MLS/HR: 1 INJECTION, POWDER, FOR SOLUTION INTRAMUSCULAR; INTRAVENOUS at 09:53

## 2020-12-10 RX ADMIN — MORPHINE SULFATE PRN MG: 2 INJECTION, SOLUTION INTRAMUSCULAR; INTRAVENOUS at 01:23

## 2020-12-10 RX ADMIN — RENAGEL SCH MG: 800 TABLET ORAL at 18:31

## 2020-12-10 RX ADMIN — RENAGEL SCH MG: 800 TABLET ORAL at 12:36

## 2020-12-10 RX ADMIN — ENOXAPARIN SODIUM SCH MG: 40 INJECTION SUBCUTANEOUS at 09:50

## 2020-12-10 NOTE — NUR
Discharge on Hold



Dr. Welsh was notified of Joselyn's concern regarding unable to move patient around and not 
having a wheelchair. Order for wheelchair placed and discharge to be held. Will notify 
patient and family.

## 2020-12-10 NOTE — NUR
Pain



Patient complained of back pain, however patient refused morphine and Norco, Ice packs 
given, will reassess.

## 2020-12-10 NOTE — NUR
Patient repositioned

Patient sat at edge of bed with RN assistance and dangled feet. Patient then repositioned 
laying in bed. Patient tolerated intervention well. Will continue to monitor.

## 2020-12-10 NOTE — NUR
Assumed care of patient

Assumed care of patient after receiving report from day RN. No s/s of distress, pain, or SOB 
noted, patient on 2L via NC. Patient resting in bed, locked x2 side rails for safety with 
HOB high fowlers. Call light within reach, patient updated on plan of care and to call for 
assistance. Will continue to monitor.

## 2020-12-10 NOTE — NUR
Discharge held

Mother Joselyn ([phone]) notified that discharge is held for tomorrow 12/11/20. Social 
worker will work to get wheelchair for the patient.

## 2020-12-10 NOTE — NUR
Assessment 



Patient is a 60-year-old male who is alert and oriented. Prior to admission patient reside 
home with family and functioned independently. Prior to admission patient could care for his 
own ADLs. Per patient he does not have any medical equipment now. Per patient he will 
return home to his prior living arrangements post discharge and family will transport him 
home at anytime on discharge day. Patient informed me he feels safe returning home and has 
great family support. Informed patient he has the right to participate in all discharge 
planning. Patient does not have an advance directive. Patient has been provided with an 
advance directive. Patient verbalize understanding d/c plan. 

-------------------------------------------------------------------------------

Addendum: 12/10/20 at 1140 by LEEANNA CONKLIN

-------------------------------------------------------------------------------

Amended: Links added.

## 2020-12-10 NOTE — NUR
Phone call with patient's mother



As per Joselyn (patient's mother), she is unable to move the patient around and he does not 
have a wheelchair. RN will follow-up with  to see if wheelchair can be ordered 
for patient.

## 2020-12-10 NOTE — NUR
Per Yu with [facility] ([phone]),  patient will be seen within 24 - 48 
hours post discharge. HENNA has notify Elizabeth LAMB.

## 2020-12-11 VITALS — SYSTOLIC BLOOD PRESSURE: 117 MMHG | DIASTOLIC BLOOD PRESSURE: 70 MMHG

## 2020-12-11 VITALS — SYSTOLIC BLOOD PRESSURE: 94 MMHG | DIASTOLIC BLOOD PRESSURE: 47 MMHG

## 2020-12-11 VITALS — DIASTOLIC BLOOD PRESSURE: 58 MMHG | SYSTOLIC BLOOD PRESSURE: 90 MMHG

## 2020-12-11 VITALS — DIASTOLIC BLOOD PRESSURE: 66 MMHG | SYSTOLIC BLOOD PRESSURE: 124 MMHG

## 2020-12-11 VITALS — SYSTOLIC BLOOD PRESSURE: 90 MMHG | DIASTOLIC BLOOD PRESSURE: 58 MMHG

## 2020-12-11 PROCEDURE — 5A1D70Z PERFORMANCE OF URINARY FILTRATION, INTERMITTENT, LESS THAN 6 HOURS PER DAY: ICD-10-PCS | Performed by: INTERNAL MEDICINE

## 2020-12-11 RX ADMIN — RENAGEL SCH MG: 800 TABLET ORAL at 07:50

## 2020-12-11 RX ADMIN — RENAGEL SCH MG: 800 TABLET ORAL at 12:07

## 2020-12-11 RX ADMIN — ACETAMINOPHEN PRN MG: 500 TABLET ORAL at 07:55

## 2020-12-11 RX ADMIN — RENAGEL SCH MG: 800 TABLET ORAL at 17:49

## 2020-12-11 RX ADMIN — FAMOTIDINE SCH MG: 20 TABLET, FILM COATED ORAL at 15:39

## 2020-12-11 RX ADMIN — CEFTRIAXONE SODIUM SCH MLS/HR: 1 INJECTION, POWDER, FOR SOLUTION INTRAMUSCULAR; INTRAVENOUS at 09:31

## 2020-12-11 RX ADMIN — ENOXAPARIN SODIUM SCH MG: 40 INJECTION SUBCUTANEOUS at 09:32

## 2020-12-11 RX ADMIN — CARISOPRODOL PRN MG: 350 TABLET ORAL at 09:35

## 2020-12-11 RX ADMIN — METOPROLOL SUCCINATE SCH MG: 50 TABLET, EXTENDED RELEASE ORAL at 09:35

## 2020-12-11 NOTE — NUR
Katherine 



Received call from [facility] regarding SNF transfer. They are arranging for patient to be 
transferred to SNF [facility]. New COVID test needed. Charge RN and night shift RN was 
notified.

## 2020-12-11 NOTE — NUR
Reassessment



Per Kalpana from Katherine, patient mother declined for her son to return home. Per Kalpana, 
patient mother stated that she is unable to take care of son. SW notify Elizabeth LAMB, per 
Elizabeth LAMB, patient is willing to transfer to SNF for care.

## 2020-12-11 NOTE — NUR
Consultation



HENNA spoke with Savita from [facility] ([phone]), per Savita, she will call HENNA with ETA for 
wheelchair arrival today 12/11/2020. Per Savita, patient has been accepted for physical 
therapy services, service will start 24 - 48 hours post discharge.

## 2020-12-11 NOTE — NUR
HENNA spoke with Bethany from [facility] ([phone]), Per Bethany, wheelchair ETA between 1 
- 2 pm today. HENNA notified Court LAMB, about arrival time.

## 2020-12-11 NOTE — NUR
Back in bed



Patient transferred himself back to bed from the wheelchair, he only needed help to 
reposition in bed.

## 2020-12-11 NOTE — NUR
Received call from patients girlfriend Patrizia. She did not have a password, per GF she had 
been having the day RN ask the patient to verify that it was okay to give her information. 
Patient is sleeping at this time; informed GF that we encourage resting in patients while 
hospitalized and she verbalized understanding and did not want him woken. KUSH FarooqPatrizia stated 
she would call back around noon to see if he is awake to give verbal verification. KUSH Mccray 
asked to pass message on to patient to call her when he can. Patrizia phone number 
[phone]. Will leave message with patient.

## 2020-12-11 NOTE — NUR
Out of bed



Patient sitting out of bed in wheelchair. Stated that he transferred to wheelchair by 
himself. He will press call light when he is ready to return to bed.

## 2020-12-11 NOTE — NUR
Wheelchair



Wheelchair delivered to patient at bedside. Patient is unable to transfer to wheelchair with 
minimum assistance. Patient stated that he just cannot do the transfer and now agrees to go 
to SNF for rehab.

## 2020-12-11 NOTE — NUR
Reassessment



SW spoke with Analisa from [facility] ([phone]), patient is being place for 
SNF. Per Analisa, patient possibly will transfer to SNF tomorrow. SW notify Sarahi LAMB, of 
transfer placement and provided name and number to [facility] after hour Nurse Analisa, who 
is placing patient to [facility] if she can get patient accepted to that 
facility.

## 2020-12-11 NOTE — NUR
SNF request 



Dr. Welsh notified of patient's request for SNF. He is unable to transfer to wheelchair and 
does not have much help at home.

## 2020-12-11 NOTE — NUR
Opening Shift Note

Assumed care of patient, awake and alert.  No S/S of distress/SOB.  Instructed on POC and to 
call for assist PRN, will continue to monitor for changes Q1hr and PRN. Patient complained 
of headache, will medicate with Tylenol as ordered. Dialysis RN at bedside.

## 2020-12-11 NOTE — NUR
SNF



Still awaiting information regarding SNF acceptance. Patient is trying to gain his strength 
back so he can help himself at home.

## 2020-12-12 VITALS — DIASTOLIC BLOOD PRESSURE: 68 MMHG | SYSTOLIC BLOOD PRESSURE: 109 MMHG

## 2020-12-12 VITALS — DIASTOLIC BLOOD PRESSURE: 58 MMHG | SYSTOLIC BLOOD PRESSURE: 103 MMHG

## 2020-12-12 VITALS — SYSTOLIC BLOOD PRESSURE: 109 MMHG | DIASTOLIC BLOOD PRESSURE: 65 MMHG

## 2020-12-12 VITALS — DIASTOLIC BLOOD PRESSURE: 73 MMHG | SYSTOLIC BLOOD PRESSURE: 99 MMHG

## 2020-12-12 VITALS — DIASTOLIC BLOOD PRESSURE: 72 MMHG | SYSTOLIC BLOOD PRESSURE: 105 MMHG

## 2020-12-12 LAB
HCT VFR BLD AUTO: 29.5 % (ref 41–53)
HGB BLD-MCNC: 9.4 G/DL (ref 13.5–17.5)
MCH RBC QN AUTO: 29.7 PG (ref 28–32)
MCV RBC AUTO: 93.8 FL (ref 80–100)
NRBC BLD QL AUTO: 0 %

## 2020-12-12 RX ADMIN — MORPHINE SULFATE PRN MG: 2 INJECTION, SOLUTION INTRAMUSCULAR; INTRAVENOUS at 03:01

## 2020-12-12 RX ADMIN — RENAGEL SCH MG: 800 TABLET ORAL at 08:00

## 2020-12-12 RX ADMIN — RENAGEL SCH MG: 800 TABLET ORAL at 18:30

## 2020-12-12 RX ADMIN — METOPROLOL SUCCINATE SCH MG: 50 TABLET, EXTENDED RELEASE ORAL at 10:00

## 2020-12-12 RX ADMIN — CEFTRIAXONE SODIUM SCH MLS/HR: 1 INJECTION, POWDER, FOR SOLUTION INTRAMUSCULAR; INTRAVENOUS at 09:43

## 2020-12-12 RX ADMIN — ENOXAPARIN SODIUM SCH MG: 40 INJECTION SUBCUTANEOUS at 09:43

## 2020-12-12 RX ADMIN — RENAGEL SCH MG: 800 TABLET ORAL at 08:22

## 2020-12-12 NOTE — NUR
CALLED [facility] AND SPOKE WITH  ROSA  REGARDING PATIENT TRANSFER.

 [phone], PER ROSA THEY NEED OUR NEGATIVE COVID RESULTS FAXED OVER. I WILL FAX OVER 
[phone].

ONCE THEY RECEIVE SHE WILL CALL ME BACK AND TRY TO GET A SNF FOR PATIENT.

## 2020-12-12 NOTE — NUR
PER DR FAYE HE SPOKE TO DR WEINBERG AND PER DR WEINBERG PATIENT WILL BE RECEIVING VANCOMYCIN WITH 
DIALYSIS.

## 2020-12-12 NOTE — NUR
Nutrition Assessment



Est energy needs 7497-4255 kcal (25-27 kcal/kg BW 81.7kg) Est protein needs 98-114g 
(1.2-1.4g/kg BW 81.7kg r/t ESRD on HD) Will monitor and reassess prn. 

-------------------------------------------------------------------------------

Addendum: 12/12/20 at 1238 by [address]ICE RIVERA RD

-------------------------------------------------------------------------------

Amended: Links added.

## 2020-12-12 NOTE — NUR
RECEIVED A CALL FROM [facility], LINE GOT DISCONNECTED. WAITED FOR CALL BACK BUT NO RETURN 
PHONE CALL RECEIVED.

CALLED BACK TO [facility] [phone] AND SPOKE WITH LUIS MIGUEL, PER LUIS MIGUEL HE SPOKE WITH 
CASE MANAGEMENT LEEANNA AND THEY ARE STILL WORKING ON PLACEMENT AND WILL UPDATE LEEANNA ONCE 
THAT IS DONE, HE IS ALSO NOT SURE THAT WILL HAPPEN TODAY.

## 2020-12-13 VITALS — SYSTOLIC BLOOD PRESSURE: 112 MMHG | DIASTOLIC BLOOD PRESSURE: 69 MMHG

## 2020-12-13 VITALS — SYSTOLIC BLOOD PRESSURE: 94 MMHG | DIASTOLIC BLOOD PRESSURE: 73 MMHG

## 2020-12-13 VITALS — SYSTOLIC BLOOD PRESSURE: 103 MMHG | DIASTOLIC BLOOD PRESSURE: 79 MMHG

## 2020-12-13 VITALS — DIASTOLIC BLOOD PRESSURE: 79 MMHG | SYSTOLIC BLOOD PRESSURE: 126 MMHG

## 2020-12-13 LAB
ANION GAP SERPL CALCULATED.3IONS-SCNC: 18 MMOL/L (ref 5–15)
BUN SERPL-MCNC: 141 MG/DL (ref 7–18)
BUN/CREAT SERPL: 8.3
CALCIUM SERPL-MCNC: 7.6 MG/DL (ref 8.5–10.1)
CHLORIDE SERPL-SCNC: 88 MMOL/L (ref 98–107)
CO2 SERPL-SCNC: 20 MMOL/L (ref 21–32)
GLUCOSE SERPL-MCNC: 117 MG/DL (ref 74–106)
HCT VFR BLD AUTO: 27.7 % (ref 41–53)
HGB BLD-MCNC: 9.3 G/DL (ref 13.5–17.5)
MCH RBC QN AUTO: 31.4 PG (ref 28–32)
MCV RBC AUTO: 94.2 FL (ref 80–100)
NRBC BLD QL AUTO: 0 %
POTASSIUM SERPL-SCNC: 5.3 MMOL/L (ref 3.5–5.1)
SODIUM SERPL-SCNC: 126 MMOL/L (ref 136–145)

## 2020-12-13 RX ADMIN — CARISOPRODOL PRN MG: 350 TABLET ORAL at 04:47

## 2020-12-13 RX ADMIN — ENOXAPARIN SODIUM SCH MG: 40 INJECTION SUBCUTANEOUS at 11:41

## 2020-12-13 RX ADMIN — RENAGEL SCH MG: 800 TABLET ORAL at 11:41

## 2020-12-13 RX ADMIN — RENAGEL SCH MG: 800 TABLET ORAL at 17:45

## 2020-12-13 RX ADMIN — RENAGEL SCH MG: 800 TABLET ORAL at 07:48

## 2020-12-13 RX ADMIN — CEFTRIAXONE SODIUM SCH MLS/HR: 1 INJECTION, POWDER, FOR SOLUTION INTRAMUSCULAR; INTRAVENOUS at 11:42

## 2020-12-13 RX ADMIN — METOPROLOL SUCCINATE SCH MG: 50 TABLET, EXTENDED RELEASE ORAL at 10:00

## 2020-12-13 RX ADMIN — FAMOTIDINE SCH MG: 20 TABLET, FILM COATED ORAL at 15:01

## 2020-12-13 RX ADMIN — CARISOPRODOL PRN MG: 350 TABLET ORAL at 15:02

## 2020-12-13 NOTE — NUR
RECEIVED A CALL FROM LUIS MIGUEL AT [facility]. PATIENT HAS BED AT [facility] ROOM 
105 BED2 

REPORT TO BE CALLED [phone]. PER LUIS MIGUEL HE WILL ARRANGE TRANSPORT TODAY AND WILL 
CALL ME BACK FOR  TIME.

## 2020-12-13 NOTE — NUR
CALLED [facility], AND PER AFTER HOURS ANSWERING SERVICE PATIENT CANNOT BE PICKED UP THEY ARE 
UNABLE TO SCHEDULE WITH [facility] OR [facility]. I ASKED IF THERE WAS DOCUMENTATION AS TO WHY AND SHE 
DOES NOT KNOW. SHE WILL TRY TO FIND OUT. OUR V NUMBER AND EXT TO [facility] WAS GIVEN WILL AWAIT 
RETURN PHONE CALL AND ENDORSE TO [facility] NURSE.

## 2020-12-13 NOTE — NUR
DISCHARGE

 ARM ARRIVED AT 1945 TO PICK PATIENT UP TO TRANSPORT TO [facility]. VSS B/P 
121/71  RR 17 

O2 95% ON ROOM AIR TEMP 98.8  TELEMETRY REMOVED AND SENT BACK TO ICU. IV REMOVED CATHETER 
INTACT. W/C AND ALL PATIENT BELONGINGS TRANSFERED WITH PATIENT